# Patient Record
Sex: MALE | Race: WHITE | Employment: FULL TIME | URBAN - METROPOLITAN AREA
[De-identification: names, ages, dates, MRNs, and addresses within clinical notes are randomized per-mention and may not be internally consistent; named-entity substitution may affect disease eponyms.]

---

## 2018-12-02 ENCOUNTER — HOSPITAL ENCOUNTER (EMERGENCY)
Facility: CLINIC | Age: 38
Discharge: HOME | End: 2018-12-02
Attending: EMERGENCY MEDICINE | Admitting: EMERGENCY MEDICINE
Payer: COMMERCIAL

## 2018-12-02 VITALS
DIASTOLIC BLOOD PRESSURE: 80 MMHG | HEIGHT: 73 IN | BODY MASS INDEX: 22.66 KG/M2 | TEMPERATURE: 98.1 F | WEIGHT: 171 LBS | OXYGEN SATURATION: 96 % | SYSTOLIC BLOOD PRESSURE: 126 MMHG

## 2018-12-02 DIAGNOSIS — J30.89 SEASONAL ALLERGIC RHINITIS DUE TO OTHER ALLERGIC TRIGGER: ICD-10-CM

## 2018-12-02 DIAGNOSIS — J02.9 ACUTE PHARYNGITIS, UNSPECIFIED ETIOLOGY: ICD-10-CM

## 2018-12-02 LAB
DEPRECATED S PYO AG THROAT QL EIA: NORMAL
SPECIMEN SOURCE: NORMAL

## 2018-12-02 PROCEDURE — 99283 EMERGENCY DEPT VISIT LOW MDM: CPT

## 2018-12-02 PROCEDURE — 87081 CULTURE SCREEN ONLY: CPT | Performed by: EMERGENCY MEDICINE

## 2018-12-02 PROCEDURE — 87880 STREP A ASSAY W/OPTIC: CPT | Performed by: EMERGENCY MEDICINE

## 2018-12-02 PROCEDURE — 25000131 ZZH RX MED GY IP 250 OP 636 PS 637: Performed by: EMERGENCY MEDICINE

## 2018-12-02 RX ORDER — FLUTICASONE PROPIONATE 50 MCG
1-2 SPRAY, SUSPENSION (ML) NASAL DAILY
Qty: 1 BOTTLE | Refills: 11 | Status: SHIPPED | OUTPATIENT
Start: 2018-12-02

## 2018-12-02 RX ORDER — DEXAMETHASONE 2 MG/1
2 TABLET ORAL EVERY 12 HOURS SCHEDULED
Status: DISCONTINUED | OUTPATIENT
Start: 2018-12-02 | End: 2018-12-02

## 2018-12-02 RX ADMIN — DEXAMETHASONE 10 MG: 2 TABLET ORAL at 02:51

## 2018-12-02 ASSESSMENT — ENCOUNTER SYMPTOMS
SORE THROAT: 1
SINUS PRESSURE: 1
FEVER: 0
TROUBLE SWALLOWING: 1
SINUS PAIN: 1

## 2018-12-02 NOTE — DISCHARGE INSTRUCTIONS
You should take Sudafed, Flonase and your Zyrtec daily to see if this helps with your sore throat, runny nose and ear pain.  You should make an appointment to follow-up with your primary care doctor for recheck in several days to see if your symptoms are improved.  You should return the emergency department if you have worsening pain, develop fever or significant swelling.

## 2018-12-02 NOTE — ED AVS SNAPSHOT
Emergency Department    [address] [facility] [address]    Phone:  [phone]    Fax:  [phone]                                       Adin Calvillo III   MRN: [id number]    Department:   Emergency Department   Date of Visit:  12/2/2018           Patient Information     Date Of Birth          1980        Your diagnoses for this visit were:     Acute pharyngitis, unspecified etiology     Seasonal allergic rhinitis due to other allergic trigger        You were seen by Max Gamboa MD.      Follow-up Information     Follow up with  Emergency Department.    Specialty:  EMERGENCY MEDICINE    Why:  As needed, If symptoms worsen    Contact information:    [address] [facility] [address] [phone]        Discharge Instructions       You should take Sudafed, Flonase and your Zyrtec daily to see if this helps with your sore throat, runny nose and ear pain.  You should make an appointment to follow-up with your primary care doctor for recheck in several days to see if your symptoms are improved.  You should return the emergency department if you have worsening pain, develop fever or significant swelling.    24 Hour Appointment Hotline       To make an appointment at any [facility], call [phone] ([phone]). If you don't have a family doctor or clinic, we will help you find one. [facility] are conveniently located to serve the needs of you and your family.             Review of your medicines      START taking        Dose / Directions Last dose taken    fluticasone 50 MCG/ACT nasal spray   Commonly known as:  FLONASE   Dose:  1-2 spray   Quantity:  1 Bottle        Spray 1-2 sprays into both nostrils daily   Refills:  11                Prescriptions were sent or printed at these locations (1 Prescription)                   Other Prescriptions                Printed at Department/Unit printer (1 of 1)         fluticasone (FLONASE) 50  MCG/ACT nasal spray                Procedures and tests performed during your visit     Beta strep group A culture    Rapid strep screen      Orders Needing Specimen Collection     None      Pending Results     Date and Time Order Name Status Description    12/2/2018 0120 Beta strep group A culture In process             Pending Culture Results     Date and Time Order Name Status Description    12/2/2018 0120 Beta strep group A culture In process             Pending Results Instructions     If you had any lab results that were not finalized at the time of your Discharge, you can call the ED Lab Result RN at [phone]. You will be contacted by this team for any positive Lab results or changes in treatment. The nurses are available 7 days a week from 10A to 6:30P.  You can leave a message 24 hours per day and they will return your call.        Test Results From Your Hospital Stay        12/2/2018  1:36 AM      Component Results     Component    Specimen Description    Throat    Rapid Strep A Screen    NEGATIVE: No Group A streptococcal antigen detected by immunoassay, await culture report.         12/2/2018  1:36 AM                Clinical Quality Measure: Blood Pressure Screening     Your blood pressure was checked while you were in the emergency department today. The last reading we obtained was  BP: 126/80 . Please read the guidelines below about what these numbers mean and what you should do about them.  If your systolic blood pressure (the top number) is less than 120 and your diastolic blood pressure (the bottom number) is less than 80, then your blood pressure is normal. There is nothing more that you need to do about it.  If your systolic blood pressure (the top number) is 120-139 or your diastolic blood pressure (the bottom number) is 80-89, your blood pressure may be higher than it should be. You should have your blood pressure rechecked within a year by a primary care provider.  If your systolic blood  "pressure (the top number) is 140 or greater or your diastolic blood pressure (the bottom number) is 90 or greater, you may have high blood pressure. High blood pressure is treatable, but if left untreated over time it can put you at risk for heart attack, stroke, or kidney failure. You should have your blood pressure rechecked by a primary care provider within the next 4 weeks.  If your provider in the emergency department today gave you specific instructions to follow-up with your doctor or provider even sooner than that, you should follow that instruction and not wait for up to 4 weeks for your follow-up visit.        Thank you for choosing [facility]       Thank you for choosing [facility] for your care. Our goal is always to provide you with excellent care. Hearing back from our patients is one way we can continue to improve our services. Please take a few minutes to complete the written survey that you may receive in the mail after you visit with us. Thank you!        [employer] Information     [employer] lets you send messages to your doctor, view your test results, renew your prescriptions, schedule appointments and more. To sign up, go to [email or web address] . Click on \"Log in\" on the left side of the screen, which will take you to the Welcome page. Then click on \"Sign up Now\" on the right side of the page.     You will be asked to enter the access code listed below, as well as some personal information. Please follow the directions to create your username and password.     Your access code is: [email or web address]  Expires: 3/2/2019  2:14 AM     Your access code will  in 90 days. If you need help or a new code, please call your [facility] clinic or [phone].        Care EveryWhere ID     This is your Care EveryWhere ID. This could be used by other organizations to access your [facility] medical records  [id number]        Equal Access to Services     JANUARY ARRIOLA : teresa Hoffmann, " jenny strong ah. [facility] [phone].    ATENCIÓN: Si habla jennifferañol, tiene a marcelo disposición servicios gratuitos de asistencia lingüística. Llame al [phone].    We comply with applicable federal civil rights laws and [place] laws. We do not discriminate on the basis of race, color, national origin, age, disability, sex, sexual orientation, or gender identity.            After Visit Summary       This is your record. Keep this with you and show to your community pharmacist(s) and doctor(s) at your next visit.

## 2018-12-02 NOTE — LETTER
December 2, 2018      To Whom It May Concern:      Adin Calvillo III was seen in our Emergency Department today, 12/02/18.  I expect his condition to improve over the next 2 days.  He may return to work/school when improved.    Sincerely,        Char Dickson RN

## 2018-12-02 NOTE — ED AVS SNAPSHOT
Emergency Department    [address] [place] [address]    Phone:  [phone]    Fax:  [phone]                                       Adin Calvillo III   MRN: [id number]    Department:   Emergency Department   Date of Visit:  12/2/2018           After Visit Summary Signature Page     I have received my discharge instructions, and my questions have been answered. I have discussed any challenges I see with this plan with the nurse or doctor.    ..........................................................................................................................................  Patient/Patient Representative Signature      ..........................................................................................................................................  Patient Representative Print Name and Relationship to Patient    ..................................................               ................................................  Date                                   Time    ..........................................................................................................................................  Reviewed by Signature/Title    ...................................................              ..............................................  Date                                               Time          22EPIC Rev 08/18

## 2018-12-02 NOTE — ED PROVIDER NOTES
"  History     Chief Complaint:  Otalgia    HPI   Adin Calvillo III is a 38 year old male who presents with 6 weeks of left ear pain. The patient denies trauma, but reports that he has had ongoing left sided ear pain, which radiates down into her left throat. He states that it has been painful to swallow, and when he yawns he feels as though there is fluid moving in his ear. The patient has not had fevers, runny nose or cold symptoms and denies associated dental pain. He has not been in to see his primary doctor and due to persistent pain this evening presents to the ED for evaluation. No other symptoms are reported at this time otherwise.     Allergies:  No known drug allergies    Medications:    Symbicort   Albuterol     Past Medical History:    Asthma    Past Surgical History:    History reviewed. No pertinent surgical history.    Family History:    History reviewed. No pertinent family history.     Social History:  The patient was not accompanied to the ED.  Smoking Status: Current every day  Smokeless Tobacco: Never  Alcohol Use: Yes  Marital Status:  Single [1]     Review of Systems   Constitutional: Negative for fever.   HENT: Positive for ear pain, sinus pain, sinus pressure, sore throat and trouble swallowing. Negative for dental problem.    All other systems reviewed and are negative.    Physical Exam     Patient Vitals for the past 24 hrs:   BP Temp Temp src Heart Rate SpO2 Height Weight   12/02/18 0256 - - - 98 - - -   12/02/18 0118 126/80 98.1  F (36.7  C) Oral 121 96 % 1.854 m (6' 1\") 77.6 kg (171 lb)     Physical Exam  Constitutional: Alert, attentive, GCS 15  HENT:    Nose: Nose normal.    Mouth/Throat: Oropharynx is mildly hyperemic, no tonsillar hypertrophy, no tonsillar exudates, uvula midline, floor of the mouth soft, no tenderness to percussion of the teeth, no buccal recess abscess.  No clicking or overt subluxation of his temporal mandibular joint.  Ears:TMs clear, trace effusion on the left " full range of motion, no swelling, no cervical or  Neck: Periauricular adenopathy  Eyes: EOM are normal, anicteric, conjugate gaze  CV: regular rate and rhythm; no murmurs  Chest: Effort normal and breath sounds clear without wheezing or rales, symmetric bilaterally   GI:  non tender. No distension. No guarding or rebound.    MSK: No LE edema, tenderness to palpation of BLE.  Neurological: Alert, attentive, moving all extremities equally.   Skin: Skin is warm and dry.    Emergency Department Course     Laboratory:  Laboratory findings were communicated with the patient who voiced understanding of the findings.    Rapid strep: Negative    Beta strep group A culture: pending     Interventions:  0251 - Decadron tablet 10 mg PO    Emergency Department Course:  Nursing notes and vitals reviewed.    0149: I performed an exam of the patient as documented above.     Findings and plan explained to the Patient. Patient discharged home with instructions regarding supportive care, medications, and reasons to return. The importance of close follow-up was reviewed.     Impression & Plan      Medical Decision Makin-year-old male past medical history significant for substance abuse, and allergic/seasonal allergies presenting for evaluation of now 6 days sore throat, nasal congestion, positional cough and pressure in his ears.  No reported fevers, vital signs within normal limits on arrival.  On exam, patient does not have any evidence or suggestion of peritonsillar abscess, retropharyngeal abscess, Siva's, parotitis, or evidence of acute otitis media.  I do not suspect Ménière's given absence of focal infection.  His throat is mildly hyperemic and his constellation of symptoms are most suggestive of possible allergic rhinitis mild eustachian tube dysfunction given his trace effusion.  Certainly temporal mandibular joint dysfunction is possible though no overt signs of this on exam.  I did recommend that he continue to take  his Claritin to schedule this daily, I prescribed Flonase and a course of Decadron and encouraged him to take NSAIDs and pseudoephedrine as needed.  Return precautions reviewed, patient was felt safe for discharge without imaging or labs.    Diagnosis:    ICD-10-CM    1. Acute pharyngitis, unspecified etiology J02.9    2. Seasonal allergic rhinitis due to other allergic trigger J30.89        Disposition:  discharged to home    Discharge Medication List as of 12/2/2018  2:23 AM      START taking these medications    Details   fluticasone (FLONASE) 50 MCG/ACT nasal spray Spray 1-2 sprays into both nostrils daily, Disp-1 Bottle, R-11, Local Print           Max Gamboa MD   [employer]  3:09 PM 12/02/18     Ninfa Lambert  12/2/2018    EMERGENCY DEPARTMENT  INinfa am serving as a scribe at 1:49 AM on 12/2/2018 to document services personally performed by Max Gamboa MD based on my observations and the provider's statements to me.       Max Gamboa MD  12/02/18 1507

## 2018-12-04 LAB
BACTERIA SPEC CULT: NORMAL
Lab: NORMAL
SPECIMEN SOURCE: NORMAL